# Patient Record
Sex: MALE | Race: OTHER
[De-identification: names, ages, dates, MRNs, and addresses within clinical notes are randomized per-mention and may not be internally consistent; named-entity substitution may affect disease eponyms.]

---

## 2018-03-20 ENCOUNTER — APPOINTMENT (OUTPATIENT)
Dept: CT IMAGING | Facility: CLINIC | Age: 63
End: 2018-03-20

## 2018-03-20 ENCOUNTER — OUTPATIENT (OUTPATIENT)
Dept: OUTPATIENT SERVICES | Facility: HOSPITAL | Age: 63
LOS: 1 days | End: 2018-03-20
Payer: COMMERCIAL

## 2018-03-20 DIAGNOSIS — Z12.2 ENCOUNTER FOR SCREENING FOR MALIGNANT NEOPLASM OF RESPIRATORY ORGANS: ICD-10-CM

## 2018-03-20 PROCEDURE — G0297: CPT

## 2018-03-20 PROCEDURE — G0297: CPT | Mod: 26

## 2024-04-22 ENCOUNTER — OUTPATIENT (OUTPATIENT)
Dept: OUTPATIENT SERVICES | Facility: HOSPITAL | Age: 69
LOS: 1 days | End: 2024-04-22
Payer: MEDICARE

## 2024-04-22 ENCOUNTER — APPOINTMENT (OUTPATIENT)
Dept: CT IMAGING | Facility: CLINIC | Age: 69
End: 2024-04-22
Payer: MEDICARE

## 2024-04-22 DIAGNOSIS — Z72.0 TOBACCO USE: ICD-10-CM

## 2024-04-22 PROCEDURE — 71250 CT THORAX DX C-: CPT | Mod: 26

## 2024-04-22 PROCEDURE — 71250 CT THORAX DX C-: CPT

## 2024-06-05 ENCOUNTER — APPOINTMENT (OUTPATIENT)
Dept: UROLOGY | Facility: CLINIC | Age: 69
End: 2024-06-05
Payer: MEDICARE

## 2024-06-05 VITALS
WEIGHT: 165 LBS | OXYGEN SATURATION: 97 % | DIASTOLIC BLOOD PRESSURE: 92 MMHG | RESPIRATION RATE: 16 BRPM | BODY MASS INDEX: 25.9 KG/M2 | HEIGHT: 67 IN | TEMPERATURE: 98 F | SYSTOLIC BLOOD PRESSURE: 156 MMHG | HEART RATE: 67 BPM

## 2024-06-05 DIAGNOSIS — F17.200 NICOTINE DEPENDENCE, UNSPECIFIED, UNCOMPLICATED: ICD-10-CM

## 2024-06-05 DIAGNOSIS — Z78.9 OTHER SPECIFIED HEALTH STATUS: ICD-10-CM

## 2024-06-05 DIAGNOSIS — Z86.39 PERSONAL HISTORY OF OTHER ENDOCRINE, NUTRITIONAL AND METABOLIC DISEASE: ICD-10-CM

## 2024-06-05 DIAGNOSIS — Z87.891 PERSONAL HISTORY OF NICOTINE DEPENDENCE: ICD-10-CM

## 2024-06-05 PROCEDURE — 99203 OFFICE O/P NEW LOW 30 MIN: CPT

## 2024-06-05 PROCEDURE — G2211 COMPLEX E/M VISIT ADD ON: CPT

## 2024-06-05 RX ORDER — ASCORBIC ACID 500 MG
TABLET ORAL
Refills: 0 | Status: ACTIVE | COMMUNITY

## 2024-06-05 RX ORDER — PNV NO.95/FERROUS FUM/FOLIC AC 28MG-0.8MG
TABLET ORAL
Refills: 0 | Status: ACTIVE | COMMUNITY

## 2024-06-05 RX ORDER — ATORVASTATIN CALCIUM 20 MG/1
20 TABLET, FILM COATED ORAL
Refills: 0 | Status: ACTIVE | COMMUNITY

## 2024-06-05 NOTE — ASSESSMENT
[FreeTextEntry1] : We had a long discussion regarding his PSA level. We discussed different PSA parameters such as PSA velocity, free PSA and age-adjusted PSA level. Other methods beyond PSA level were discussed such as urinary and serum biomarkers, imaging, risk calculators, etc.  We also discussed observation with repeat PSA level, multiparametric prostate MRI and prostate biopsy in detail. Risks and benefits of each option were discussed and questions were answered.  The purpose of prostate MRI is to assess for lesions which may require fusion prostate biopsy for diagnosis of prostate cancer.  Patient was made aware that prostate cancer can exist at any PSA level. Prostate biopsy was reviewed in detail. We discussed how the procedure is performed. Preparation prior to biopsy such as Fleet enema was reviewed. Trans-perineal approach (fusion or random biopsies) was discussed. Risks of biopsy especially bacteremia, UTI, sepsis, bleeding, erectile dysfunction, hematuria, hematospermia, etc, were discussed. He was made aware that prostate cancer can be missed with prostate biopsy (false negative). Questions were answered.  We will discuss the results of MRI on the phone and then the next step.

## 2024-06-05 NOTE — LETTER BODY
[Dear  ___] : Dear  [unfilled], [Consult Letter:] : I had the pleasure of evaluating your patient, [unfilled]. [Consult Closing:] : Thank you very much for allowing me to participate in the care of this patient.  If you have any questions, please do not hesitate to contact me. [FreeTextEntry1] : [facility] [address] [place], [place], [id number] [phone]

## 2024-06-05 NOTE — HISTORY OF PRESENT ILLNESS
[FreeTextEntry1] : He is a 69-year-old man who is seen today for initial visit.  He was told to have elevated PSA level.  Nocturia is twice but he does not limit fluids before bedtime.  There is no known family history of prostate cancer.  In May 2024 urinalysis was normal and PSA was 5.7.  In 2019, PSA level was 3.2.  Residual urine volume today was minimal.

## 2024-06-05 NOTE — PHYSICAL EXAM
[General Appearance - Well Developed] : well developed [General Appearance - Well Nourished] : well nourished [Normal Appearance] : normal appearance [Well Groomed] : well groomed [General Appearance - In No Acute Distress] : no acute distress [Edema] : no peripheral edema [Respiration, Rhythm And Depth] : normal respiratory rhythm and effort [Exaggerated Use Of Accessory Muscles For Inspiration] : no accessory muscle use [Abdomen Soft] : soft [Abdomen Tenderness] : non-tender [Costovertebral Angle Tenderness] : no ~M costovertebral angle tenderness [Urethral Meatus] : meatus normal [Penis Abnormality] : normal circumcised penis [Urinary Bladder Findings] : the bladder was normal on palpation [Epididymis] : the epididymides were normal [Testes Tenderness] : no tenderness of the testes [Testes Mass (___cm)] : there were no testicular masses [Prostate Tenderness] : the prostate was not tender [No Prostate Nodules] : no prostate nodules [Normal Station and Gait] : the gait and station were normal for the patient's age [] : no rash [No Focal Deficits] : no focal deficits [Oriented To Time, Place, And Person] : oriented to person, place, and time [Affect] : the affect was normal [Mood] : the mood was normal [Not Anxious] : not anxious [Inguinal Lymph Nodes Enlarged Bilaterally] : inguinal

## 2024-06-18 ENCOUNTER — RESULT REVIEW (OUTPATIENT)
Age: 69
End: 2024-06-18

## 2024-06-18 ENCOUNTER — APPOINTMENT (OUTPATIENT)
Dept: MRI IMAGING | Facility: CLINIC | Age: 69
End: 2024-06-18
Payer: MEDICARE

## 2024-06-18 PROCEDURE — 76498P: CUSTOM | Mod: 26

## 2024-06-18 PROCEDURE — 72197 MRI PELVIS W/O & W/DYE: CPT | Mod: 26

## 2024-06-21 ENCOUNTER — NON-APPOINTMENT (OUTPATIENT)
Age: 69
End: 2024-06-21

## 2024-06-21 DIAGNOSIS — R97.20 ELEVATED PROSTATE, SPECIFIC ANTIGEN [PSA]: ICD-10-CM

## 2024-07-15 ENCOUNTER — NON-APPOINTMENT (OUTPATIENT)
Age: 69
End: 2024-07-15

## 2024-07-15 ENCOUNTER — TRANSCRIPTION ENCOUNTER (OUTPATIENT)
Age: 69
End: 2024-07-15

## 2024-07-16 ENCOUNTER — TRANSCRIPTION ENCOUNTER (OUTPATIENT)
Age: 69
End: 2024-07-16

## 2024-07-16 ENCOUNTER — RESULT REVIEW (OUTPATIENT)
Age: 69
End: 2024-07-16

## 2024-07-16 ENCOUNTER — APPOINTMENT (OUTPATIENT)
Dept: UROLOGY | Facility: AMBULATORY SURGERY CENTER | Age: 69
End: 2024-07-16

## 2024-07-17 ENCOUNTER — NON-APPOINTMENT (OUTPATIENT)
Age: 69
End: 2024-07-17

## 2024-07-26 ENCOUNTER — APPOINTMENT (OUTPATIENT)
Dept: UROLOGY | Facility: CLINIC | Age: 69
End: 2024-07-26
Payer: MEDICARE

## 2024-07-26 DIAGNOSIS — C61 MALIGNANT NEOPLASM OF PROSTATE: ICD-10-CM

## 2024-07-26 DIAGNOSIS — R97.20 ELEVATED PROSTATE, SPECIFIC ANTIGEN [PSA]: ICD-10-CM

## 2024-07-26 PROCEDURE — 99214 OFFICE O/P EST MOD 30 MIN: CPT | Mod: 24

## 2024-07-26 PROCEDURE — G2211 COMPLEX E/M VISIT ADD ON: CPT

## 2024-07-26 NOTE — PHYSICAL EXAM
[General Appearance - Well Developed] : well developed [General Appearance - Well Nourished] : well nourished [] : no respiratory distress [Exaggerated Use Of Accessory Muscles For Inspiration] : no accessory muscle use [Normal] : no joint swelling, no clubbing or cyanosis of the fingernails and muscle strength and tone were normal [de-identified] : Genital exam done in June 2024

## 2024-07-26 NOTE — DISEASE MANAGEMENT
[1] : T1 [c] : c [0] : M0 [0-10] : 0 -10 ng/mL [Biopsy with Fusion] : Patient had a biopsy with fusion on [7(3+4)] : Fusion Biopsy [place] Score: 7(3+4) [Biopsy results sent to PCP/Referring Physician] : Biopsy results sent to PCP/Referring Physician [4] : 4 [IIB] : IIB [] : Patient had no Bone Scan performed [BiopsyDate] : 07/24 [MeasuredProstateVolume] : 50 [TotalCores] : 13 [TotalPositiveCores] : 6 [MaxCoreInvolvement] : 80

## 2024-07-26 NOTE — DISEASE MANAGEMENT
[1] : T1 [c] : c [0] : M0 [0-10] : 0 -10 ng/mL [Biopsy with Fusion] : Patient had a biopsy with fusion on [7(3+4)] : Fusion Biopsy [place] Score: 7(3+4) [Biopsy results sent to PCP/Referring Physician] : Biopsy results sent to PCP/Referring Physician [4] : 4 [IIB] : IIB [] : Patient had no Bone Scan performed [BiopsyDate] : 07/24 [MeasuredProstateVolume] : 50 [TotalCores] : 13 [TotalPositiveCores] : 6 [MaxCoreInvolvement] : 80

## 2024-07-26 NOTE — LETTER BODY
[Dear  ___] : Dear  [unfilled], [Consult Letter:] : I had the pleasure of evaluating your patient, [unfilled]. [Consult Closing:] : Thank you very much for allowing me to participate in the care of this patient.  If you have any questions, please do not hesitate to contact me. [FreeTextEntry1] : [facility] [address] [place], [place], [id number] [phone]

## 2024-07-26 NOTE — HISTORY OF PRESENT ILLNESS
[FreeTextEntry1] : He is a 69-year-old man who is seen today for elevated PSA level. Prostate MRI in June 2024 showed a 50 g prostate and left sided PIRADS 4 lesion. Trasperineal fusion prostate biopsy in July 2024 showed multiple cores with Falguni 3+4 prostate cancer, with perineural invasion. Pattern 4 was 10%. He is here today to discuss the next step.   Previous note: He was told to have elevated PSA level.  Nocturia is twice but he does not limit fluids before bedtime.  There is no known family history of prostate cancer.  In May 2024 urinalysis was normal and PSA was 5.7.  In 2019, PSA level was 3.2.  Residual urine volume today was minimal.

## 2024-07-26 NOTE — LETTER BODY
[Dear  ___] : Dear  [unfilled], [Consult Letter:] : I had the pleasure of evaluating your patient, [unfilled]. [Consult Closing:] : Thank you very much for allowing me to participate in the care of this patient.  If you have any questions, please do not hesitate to contact me. [FreeTextEntry1] : [facility] [address] [place], [place], [id number] [phone]

## 2024-07-26 NOTE — PHYSICAL EXAM
[General Appearance - Well Developed] : well developed [General Appearance - Well Nourished] : well nourished [] : no respiratory distress [Exaggerated Use Of Accessory Muscles For Inspiration] : no accessory muscle use [Normal] : no joint swelling, no clubbing or cyanosis of the fingernails and muscle strength and tone were normal [de-identified] : Genital exam done in June 2024

## 2024-08-07 NOTE — REASON FOR VISIT
[Consideration of Curative Therapy] : consideration of curative therapy for prostate cancer [FreeTextEntry2] : Right shoulder

## 2024-08-08 ENCOUNTER — APPOINTMENT (OUTPATIENT)
Dept: UROLOGY | Facility: CLINIC | Age: 69
End: 2024-08-08

## 2024-08-08 PROCEDURE — 99214 OFFICE O/P EST MOD 30 MIN: CPT

## 2024-08-20 ENCOUNTER — NON-APPOINTMENT (OUTPATIENT)
Age: 69
End: 2024-08-20

## 2024-08-21 ENCOUNTER — APPOINTMENT (OUTPATIENT)
Dept: UROLOGY | Facility: CLINIC | Age: 69
End: 2024-08-21
Payer: MEDICARE

## 2024-08-21 VITALS
DIASTOLIC BLOOD PRESSURE: 86 MMHG | HEIGHT: 67 IN | WEIGHT: 165 LBS | HEART RATE: 76 BPM | SYSTOLIC BLOOD PRESSURE: 141 MMHG | TEMPERATURE: 98.1 F | RESPIRATION RATE: 17 BRPM | BODY MASS INDEX: 25.9 KG/M2

## 2024-08-21 DIAGNOSIS — C61 MALIGNANT NEOPLASM OF PROSTATE: ICD-10-CM

## 2024-08-21 PROCEDURE — 99215 OFFICE O/P EST HI 40 MIN: CPT

## 2024-08-21 PROCEDURE — G2211 COMPLEX E/M VISIT ADD ON: CPT

## 2024-08-22 NOTE — HISTORY OF PRESENT ILLNESS
[FreeTextEntry1] : Mr Mak is a 69-year-old male presenting with adenocarcinoma of the prostate Falguni 3+4, 6 out of 13 cores positive with associated perineural invasion, initial PSA 5.7ng/ml  Oncologic history Patient was noted to have elevated PSA, PSA level was 3.2 in 2019, which trended up to 5.7 in May 2024.  He then underwent a prostate MRI.  MRI prostate on 6/18/2024 demonstrated a PI-RADS 4 lesion in the left posterior medial lateral peripheral zone, the tumor abuts but does not deform the capsule.  PI-RADS 4.  No seminal vesicle invasion, no evidence of neurovascular bundle invasion, no pelvic adenopathy.  Prostate size was 50.1 cc  Prostate biopsy on 7/16/2024 demonstrated 6 out of 13 cores positive for adenocarcinoma of the prostate [place] 3+4 present in 4 out of 13 cores with associated perineural invasion and [place] 3+3 in 2 out of 13 cores.  The  MRI target lesion demonstrated Falguni 3+4 with 40% maximum core involvement.

## 2024-08-22 NOTE — DISEASE MANAGEMENT
[1] : T1 [c] : c [0] : N0 [X] : MX [0-10] : 0 -10 ng/mL [Biopsy with Fusion] : Patient had a biopsy with fusion on [7(3+4)] : Fusion Biopsy [place] Score: 7(3+4) [4] : 4 [IIB] : IIB [] : Patient had no Bone Scan performed [BiopsyDate] : 7/16/2024 [MeasuredProstateVolume] : 51

## 2024-08-22 NOTE — DISEASE MANAGEMENT
[1] : T1 [c] : c [0] : N0 [X] : MX [0-10] : 0 -10 ng/mL [Biopsy with Fusion] : Patient had a biopsy with fusion on [7(3+4)] : Fusion Biopsy [place] Score: 7(3+4) [4] : 4 [IIB] : IIB [] : Patient had no Bone Scan performed [BiopsyDate] : 7/16/2024 [MeasuredProstateVolume] : 51

## 2024-08-22 NOTE — HISTORY OF PRESENT ILLNESS
[FreeTextEntry1] : Mr Mak is a 69-year-old male presenting with adenocarcinoma of the prostate Falguni 3+4, 6 out of 13 cores positive with associated perineural invasion, initial PSA 5.7ng/ml  Oncologic history Patient was noted to have elevated PSA, PSA level was 3.2 in 2019, which trended up to 5.7 in May 2024.  He then underwent a prostate MRI.  MRI prostate on 6/18/2024 demonstrated a PI-RADS 4 lesion in the left posterior medial lateral peripheral zone, the tumor abuts but does not deform the capsule.  PI-RADS 4.  No seminal vesicle invasion, no evidence of neurovascular bundle invasion, no pelvic adenopathy.  Prostate size was 50.1 cc  Prostate biopsy on 7/16/2024 demonstrated 6 out of 13 cores positive for adenocarcinoma of the prostate [place] 3+4 present in 4 out of 13 cores with associated perineural invasion and [place] 3+3 in 2 out of 13 cores.  The  MRI target lesion demonstrated Falguni 3+4 with 40% maximum core involvement.

## 2024-08-22 NOTE — HISTORY OF PRESENT ILLNESS
[FreeTextEntry1] : Mr Mak is a 69-year-old male presenting with adenocarcinoma of the prostate Falguni 3+4, 6 out of 13 cores positive with associated perineural invasion, initial PSA 5.7ng/ml  Oncologic history Patient was noted to have elevated PSA, PSA level was 3.2 in 2019, which trended up to 5.7 in May 2024.  He then underwent a prostate MRI.  MRI prostate on 6/18/2024 demonstrated a PI-RADS 4 lesion in the left posterior medial lateral peripheral zone, the tumor abuts but does not deform the capsule.  PI-RADS 4.  No seminal vesicle invasion, no evidence of neurovascular bundle invasion, no pelvic adenopathy.  Prostate size was 50.1 cc  Prostate biopsy on 7/16/2024 demonstrated 6 out of 13 cores positive for adenocarcinoma of the prostate [place] 3+4 present in 4 out of 13 cores with associated perineural invasion and [place] 3+3 in 2 out of 13 cores.  The  MRI target lesion demonstrated Falguni 3+4 with 40% maximum core involvement.

## 2024-08-22 NOTE — DISEASE MANAGEMENT
[1] : T1 [c] : c [0] : N0 [X] : MX [0-10] : 0 -10 ng/mL [Biopsy with Fusion] : Patient had a biopsy with fusion on [7(3+4)] : Fusion Biopsy [place] Score: 7(3+4) [4] : 4 [IIB] : IIB [] : Patient had no Bone Scan performed [BiopsyDate] : 7/16/2024 [MeasuredProstateVolume] : 51

## 2024-08-23 NOTE — DISEASE MANAGEMENT
[1] : T1 [c] : c [0] : M0 [0-10] : 0 -10 ng/mL [Biopsy with Fusion] : Patient had a biopsy with fusion on [7(3+4)] : Fusion Biopsy [place] Score: 7(3+4) [Biopsy results sent to PCP/Referring Physician] : Biopsy results sent to PCP/Referring Physician [4] : 4 [IIB] : IIB [] : Patient had no Bone Scan performed [BiopsyDate] : 07/24 [MeasuredProstateVolume] : 50 [TotalPositiveCores] : 6 [TotalCores] : 13 [MaxCoreInvolvement] : 80

## 2024-08-23 NOTE — HISTORY OF PRESENT ILLNESS
[FreeTextEntry1] : Kulwinder Gupta presents to the office today referred by Dr. Denise for consideration of treatment for recently diagnosed prostate cancer.  The patient has been diagnosed with [place] 3+4 prostate cancer.  He underwent biopsy in late July.  The prostate was 50 cm on MRI.  He had a PI-RADS category 4 lesion noted.  Biopsies from the targeted area showed Falguni 3+4 disease.  He also had some template biopsies from the same area of the prostate showing [place] 3+4 prostate cancer.  In total, 6 cores of 13 were positive for malignancy.  All of the positive biopsies are located at the left posterior lateral aspect of the prostate with the exception of 1 which is at the right posterior medial apex showing small volume [place] 6.  The patient is interested to hear all of his options but reports that he is most interested and focal ablation, but is also planning to see Dr. Rinaldi in radiation oncology next week for discussion of radiation treatment approaches.

## 2024-08-23 NOTE — HISTORY OF PRESENT ILLNESS
[FreeTextEntry1] : Kulwinder Gupta presents to the office today referred by Dr. Denise for consideration of treatment for recently diagnosed prostate cancer.  The patient has been diagnosed with [place] 3+4 prostate cancer.  He underwent biopsy in late July.  The prostate was 50 cm on MRI.  He had a PI-RADS category 4 lesion noted.  Biopsies from the targeted area showed Falguni 3+4 disease.  He also had some template biopsies from the same area of the prostate showing [place] 3+4 prostate cancer.  In total, 6 cores of 13 were positive for malignancy.  All of the positive biopsies are located at the left posterior lateral aspect of the prostate with the exception of 1 which is at the right posterior medial apex showing small volume [place] 6.  The patient is interested to hear all of his options but reports that he is most interested and focal ablation, but is also planning to see Dr. Rinaldi in radiation oncology next week for discussion of radiation treatment approaches.

## 2024-08-23 NOTE — LETTER CLOSING
[FreeTextEntry3] : Sincerely,      Krystian Cole MD, FACS Director of Urology Services, [facility] Chief of Urology, [facility]  of Urology   [facility], [place] [id number] P: [phone] F: [phone] [email or web address]

## 2024-08-23 NOTE — DISEASE MANAGEMENT
[1] : T1 [c] : c [0] : M0 [0-10] : 0 -10 ng/mL [Biopsy with Fusion] : Patient had a biopsy with fusion on [7(3+4)] : Fusion Biopsy [place] Score: 7(3+4) [Biopsy results sent to PCP/Referring Physician] : Biopsy results sent to PCP/Referring Physician [4] : 4 [IIB] : IIB [] : Patient had no Bone Scan performed [BiopsyDate] : 07/24 [MeasuredProstateVolume] : 50 [TotalCores] : 13 [TotalPositiveCores] : 6 [MaxCoreInvolvement] : 80

## 2024-08-23 NOTE — LETTER CLOSING
[FreeTextEntry3] : Sincerely,      Krystian Cole MD, FACS Director of Urology Services, [facility] Chief of Urology, [facility]  of Urology   [facility], [place] [id number] P: [phone] F: [phone] [facility]

## 2024-08-23 NOTE — LETTER GREETING
[Dear  ___] : Dear  [unfilled], [Follow-Up] : Your patient, [unfilled] was seen in my office today for follow-up [Please see my note below.] : Please see my note below. [FreeTextEntry2] : Sarah Hermosillo MD [address] [place], [place] [id number]

## 2024-08-23 NOTE — LETTER GREETING
[Dear  ___] : Dear  [unfilled], [Follow-Up] : Your patient, [unfilled] was seen in my office today for follow-up [Please see my note below.] : Please see my note below. [FreeTextEntry2] : Sarah Hermosillo MD [address] [place], [place] [id number]

## 2024-08-27 ENCOUNTER — APPOINTMENT (OUTPATIENT)
Dept: RADIATION ONCOLOGY | Facility: CLINIC | Age: 69
End: 2024-08-27